# Patient Record
Sex: FEMALE | Race: BLACK OR AFRICAN AMERICAN | NOT HISPANIC OR LATINO | URBAN - METROPOLITAN AREA
[De-identification: names, ages, dates, MRNs, and addresses within clinical notes are randomized per-mention and may not be internally consistent; named-entity substitution may affect disease eponyms.]

---

## 2021-06-16 ENCOUNTER — INPATIENT (INPATIENT)
Facility: HOSPITAL | Age: 81
LOS: 1 days | Discharge: HOME | End: 2021-06-18
Attending: INTERNAL MEDICINE | Admitting: INTERNAL MEDICINE
Payer: MEDICARE

## 2021-06-16 VITALS
OXYGEN SATURATION: 99 % | DIASTOLIC BLOOD PRESSURE: 81 MMHG | WEIGHT: 136.91 LBS | RESPIRATION RATE: 18 BRPM | SYSTOLIC BLOOD PRESSURE: 143 MMHG | TEMPERATURE: 98 F | HEART RATE: 67 BPM | HEIGHT: 64 IN

## 2021-06-16 LAB
ALBUMIN SERPL ELPH-MCNC: 3.2 G/DL — LOW (ref 3.3–5)
ALP SERPL-CCNC: 138 U/L — HIGH (ref 40–120)
ALT FLD-CCNC: 11 U/L — LOW (ref 12–78)
ANION GAP SERPL CALC-SCNC: 8 MMOL/L — SIGNIFICANT CHANGE UP (ref 5–17)
APTT BLD: 25.9 SEC — LOW (ref 27.5–35.5)
AST SERPL-CCNC: 8 U/L — LOW (ref 15–37)
BASOPHILS # BLD AUTO: 0.03 K/UL — SIGNIFICANT CHANGE UP (ref 0–0.2)
BASOPHILS NFR BLD AUTO: 0.5 % — SIGNIFICANT CHANGE UP (ref 0–2)
BILIRUB SERPL-MCNC: 1 MG/DL — SIGNIFICANT CHANGE UP (ref 0.2–1.2)
BUN SERPL-MCNC: 10 MG/DL — SIGNIFICANT CHANGE UP (ref 7–23)
CALCIUM SERPL-MCNC: 8.7 MG/DL — SIGNIFICANT CHANGE UP (ref 8.5–10.1)
CHLORIDE SERPL-SCNC: 105 MMOL/L — SIGNIFICANT CHANGE UP (ref 96–108)
CO2 SERPL-SCNC: 26 MMOL/L — SIGNIFICANT CHANGE UP (ref 22–31)
CREAT SERPL-MCNC: 1 MG/DL — SIGNIFICANT CHANGE UP (ref 0.5–1.3)
EOSINOPHIL # BLD AUTO: 0.09 K/UL — SIGNIFICANT CHANGE UP (ref 0–0.5)
EOSINOPHIL NFR BLD AUTO: 1.5 % — SIGNIFICANT CHANGE UP (ref 0–6)
FLUAV AG NPH QL: SIGNIFICANT CHANGE UP
FLUBV AG NPH QL: SIGNIFICANT CHANGE UP
GLUCOSE BLDC GLUCOMTR-MCNC: 110 MG/DL — HIGH (ref 70–99)
GLUCOSE SERPL-MCNC: 114 MG/DL — HIGH (ref 70–99)
HCT VFR BLD CALC: 40.1 % — SIGNIFICANT CHANGE UP (ref 34.5–45)
HGB BLD-MCNC: 12.3 G/DL — SIGNIFICANT CHANGE UP (ref 11.5–15.5)
IMM GRANULOCYTES NFR BLD AUTO: 0.2 % — SIGNIFICANT CHANGE UP (ref 0–1.5)
INR BLD: 0.99 RATIO — SIGNIFICANT CHANGE UP (ref 0.88–1.16)
LYMPHOCYTES # BLD AUTO: 0.89 K/UL — LOW (ref 1–3.3)
LYMPHOCYTES # BLD AUTO: 14.9 % — SIGNIFICANT CHANGE UP (ref 13–44)
MCHC RBC-ENTMCNC: 24.3 PG — LOW (ref 27–34)
MCHC RBC-ENTMCNC: 30.7 GM/DL — LOW (ref 32–36)
MCV RBC AUTO: 79.2 FL — LOW (ref 80–100)
MONOCYTES # BLD AUTO: 0.61 K/UL — SIGNIFICANT CHANGE UP (ref 0–0.9)
MONOCYTES NFR BLD AUTO: 10.2 % — SIGNIFICANT CHANGE UP (ref 2–14)
NEUTROPHILS # BLD AUTO: 4.34 K/UL — SIGNIFICANT CHANGE UP (ref 1.8–7.4)
NEUTROPHILS NFR BLD AUTO: 72.7 % — SIGNIFICANT CHANGE UP (ref 43–77)
NRBC # BLD: 0 /100 WBCS — SIGNIFICANT CHANGE UP (ref 0–0)
PLATELET # BLD AUTO: 181 K/UL — SIGNIFICANT CHANGE UP (ref 150–400)
POTASSIUM SERPL-MCNC: 3.4 MMOL/L — LOW (ref 3.5–5.3)
POTASSIUM SERPL-SCNC: 3.4 MMOL/L — LOW (ref 3.5–5.3)
PROT SERPL-MCNC: 7.9 GM/DL — SIGNIFICANT CHANGE UP (ref 6–8.3)
PROTHROM AB SERPL-ACNC: 11.5 SEC — SIGNIFICANT CHANGE UP (ref 10.6–13.6)
RBC # BLD: 5.06 M/UL — SIGNIFICANT CHANGE UP (ref 3.8–5.2)
RBC # FLD: 17.8 % — HIGH (ref 10.3–14.5)
SARS-COV-2 RNA SPEC QL NAA+PROBE: SIGNIFICANT CHANGE UP
SODIUM SERPL-SCNC: 139 MMOL/L — SIGNIFICANT CHANGE UP (ref 135–145)
TROPONIN I SERPL-MCNC: <.015 NG/ML — SIGNIFICANT CHANGE UP (ref 0.01–0.04)
WBC # BLD: 5.97 K/UL — SIGNIFICANT CHANGE UP (ref 3.8–10.5)
WBC # FLD AUTO: 5.97 K/UL — SIGNIFICANT CHANGE UP (ref 3.8–10.5)

## 2021-06-16 PROCEDURE — 70498 CT ANGIOGRAPHY NECK: CPT | Mod: 26,MA

## 2021-06-16 PROCEDURE — 99291 CRITICAL CARE FIRST HOUR: CPT

## 2021-06-16 PROCEDURE — G0426: CPT | Mod: 95

## 2021-06-16 PROCEDURE — 0042T: CPT

## 2021-06-16 PROCEDURE — 93010 ELECTROCARDIOGRAM REPORT: CPT

## 2021-06-16 PROCEDURE — 70496 CT ANGIOGRAPHY HEAD: CPT | Mod: 26,MA

## 2021-06-16 PROCEDURE — 70450 CT HEAD/BRAIN W/O DYE: CPT | Mod: 26,MA,59

## 2021-06-16 PROCEDURE — 99222 1ST HOSP IP/OBS MODERATE 55: CPT

## 2021-06-16 RX ORDER — POTASSIUM CHLORIDE 20 MEQ
40 PACKET (EA) ORAL ONCE
Refills: 0 | Status: COMPLETED | OUTPATIENT
Start: 2021-06-16 | End: 2021-06-16

## 2021-06-16 RX ORDER — ASPIRIN/CALCIUM CARB/MAGNESIUM 324 MG
325 TABLET ORAL ONCE
Refills: 0 | Status: COMPLETED | OUTPATIENT
Start: 2021-06-16 | End: 2021-06-16

## 2021-06-16 RX ADMIN — Medication 40 MILLIEQUIVALENT(S): at 23:54

## 2021-06-16 RX ADMIN — Medication 325 MILLIGRAM(S): at 21:50

## 2021-06-16 NOTE — ED ADULT NURSE NOTE - NEURO MENTATION
normal

## 2021-06-16 NOTE — ED ADULT NURSE NOTE - NSIMPLEMENTINTERV_GEN_ALL_ED
Implemented All Fall Risk Interventions:  [place] to call system. Call bell, personal items and telephone within reach. Instruct patient to call for assistance. Room bathroom lighting operational. Non-slip footwear when patient is off stretcher. Physically safe environment: no spills, clutter or unnecessary equipment. Stretcher in lowest position, wheels locked, appropriate side rails in place. Provide visual cue, wrist band, yellow gown, etc. Monitor gait and stability. Monitor for mental status changes and reorient to person, place, and time. Review medications for side effects contributing to fall risk. Reinforce activity limits and safety measures with patient and family.

## 2021-06-16 NOTE — ED PROVIDER NOTE - CLINICAL SUMMARY MEDICAL DECISION MAKING FREE TEXT BOX
DDx: CVA outside of tPA window. Potential for cloth retrieval. Rule out ICH.   Plan: Code stroke. CBC, CMP, Troponin, neurology consult. Likely admission.

## 2021-06-16 NOTE — ED ADULT NURSE NOTE - NURSING NEURO ORIENTATION
Patient is a 64y old  Female who presents with a chief complaint of cp (01 Mar 2020 11:16)        PAST MEDICAL & SURGICAL HISTORY:  Hypothyroid  History of spider veins  Low back pain radiating to left leg  Lumbar herniated disc: L4-5 and L5-S1  Hyperlipidemia  Hypertension  Myocardial infarction:   Type 2 diabetes mellitus  H/O arthroscopy of right knee:   History of bunionectomy of left great toe:   H/O cataract extraction: bilateral 2019  S/P total hysterectomy:  for uterine fibroids  S/P primary angioplasty with coronary stent: 3 stents 2010 and 2 stents 2010      Summary of admission HPI:                PREVIOUS DIAGNOSTIC TESTING:      ECHO  FINDINGS:    STRESS  FINDINGS:    CATHETERIZATION  FINDINGS:    ELECTROPHYSIOLOGY STUDY  FINDINGS:    CAROTID ULTRASOUND:  FINDINGS    VENOUS DUPLEX SCAN:  FINDINGS:    CHEST CT PULMONARY ANGIO with IV Contrast:  FINDINGS:  MEDICATIONS  (STANDING):  aspirin  chewable 81 milliGRAM(s) Oral daily  atorvastatin 20 milliGRAM(s) Oral at bedtime  clopidogrel Tablet 75 milliGRAM(s) Oral daily  dextrose 5%. 1000 milliLiter(s) (50 mL/Hr) IV Continuous <Continuous>  dextrose 50% Injectable 12.5 Gram(s) IV Push once  dextrose 50% Injectable 25 Gram(s) IV Push once  dextrose 50% Injectable 25 Gram(s) IV Push once  gabapentin 600 milliGRAM(s) Oral at bedtime  heparin  Infusion.  Unit(s)/Hr (8 mL/Hr) IV Continuous <Continuous>  insulin glargine Injectable (LANTUS) 20 Unit(s) SubCutaneous at bedtime  insulin lispro (HumaLOG) corrective regimen sliding scale   SubCutaneous three times a day before meals  isosorbide   mononitrate ER Tablet (IMDUR) 60 milliGRAM(s) Oral daily  levothyroxine 50 MICROGram(s) Oral daily  metoprolol tartrate 50 milliGRAM(s) Oral two times a day  potassium chloride    Tablet ER 40 milliEquivalent(s) Oral once    MEDICATIONS  (PRN):  dextrose 40% Gel 15 Gram(s) Oral once PRN Blood Glucose LESS THAN 70 milliGRAM(s)/deciliter  glucagon  Injectable 1 milliGRAM(s) IntraMuscular once PRN Glucose LESS THAN 70 milligrams/deciliter  heparin  Injectable 4100 Unit(s) IV Push every 6 hours PRN For aPTT less than 40  nitroglycerin     SubLingual 0.4 milliGRAM(s) SubLingual every 5 minutes PRN Chest Pain      FAMILY HISTORY:  Family history of myocardial infarction (Father)  Family history of type 2 diabetes mellitus (Father, Sibling)  Family history of hypertension (Father)  Family history of stroke (Mother)      SOCIAL HISTORY:    CIGARETTES:    ALCOHOL:    REVIEW OF SYSTEMS:    CONSTITUTIONAL: No fever, weight loss, chills, shakes, or fatigue  EYES: No eye pain, visual disturbances, or discharge  ENMT:  No difficulty hearing, tinnitus, vertigo; No sinus or throat pain  NECK: No pain or stiffness  BREASTS: No pain, masses, or nipple discharge  RESPIRATORY: No cough, wheezing, hemoptysis, or shortness of breath  CARDIOVASCULAR: No chest pain, dyspnea, palpitations, dizziness, syncope, paroxysmal nocturnal dyspnea, orthopnea, or arm or leg swelling  GASTROINTESTINAL: No abdominal  or epigastric pain, nausea, vomiting, hematemesis, diarrhea, constipation, melena or bright red blood.  GENITOURINARY: No dysuria, nocturia, hematuria, or urinary incontinence  NEUROLOGICAL: No headaches, memory loss, slurred speech, limb weakness, loss of strength, numbness, or tremors  SKIN: No itching, burning, rashes, or lesions   LYMPH NODES: No enlarged glands  ENDOCRINE: No heat or cold intolerance, or hair loss  MUSCULOSKELETAL: No joint pain or swelling, muscle, back, or extremity pain  PSYCHIATRIC: No depression, anxiety, or difficulty sleeping  HEME/LYMPH: No easy bruising or bleeding gums  ALLERY AND IMMUNOLOGIC: No hives or rash.      Vital Signs Last 24 Hrs  T(C): 36.3 (01 Mar 2020 15:45), Max: 36.7 (01 Mar 2020 07:59)  T(F): 97.3 (01 Mar 2020 15:45), Max: 98 (01 Mar 2020 07:59)  HR: 93 (01 Mar 2020 15:45) (92 - 99)  BP: 110/68 (01 Mar 2020 15:45) (103/59 - 147/89)  BP(mean): --  RR: 19 (01 Mar 2020 15:45) (14 - 19)  SpO2: 98% (01 Mar 2020 15:45) (96% - 98%)    PHYSICAL EXAM:    GENERAL: In no apparent distress, well nourished, and hydrated.  HEAD:  Atraumatic, Normocephalic  EYES: EOMI, PERRLA, conjunctiva and sclera clear  ENMT: No tonsillar erythema, exudates, or enlargement; Moist mucous membranes, Good dentition, No lesions  NECK: Supple and normal thyroid.  No JVD or carotid bruit.  Carotid pulse is 2+ bilaterally.  HEART: Regular rate and rhythm; No murmurs, rubs, or gallops.  PULMONARY: Clear to auscultation and perfusion.  No rales, wheezing, or rhonchi bilaterally.  ABDOMEN: Soft, Nontender, Nondistended; Bowel sounds present  EXTREMITIES:  2+ Peripheral Pulses, No clubbing, cyanosis, or edema  LYMPH: No lymphadenopathy noted  NEUROLOGICAL: Grossly nonfocal          INTERPRETATION OF TELEMETRY:    ECG:    I&O's Detail      LABS:                        12.4   8.47  )-----------( 206      ( 01 Mar 2020 16:27 )             37.9     03-01    138  |  103  |  13  ----------------------------<  423<H>  3.4<L>   |  27  |  1.19    Ca    9.9      01 Mar 2020 08:37    TPro  6.9  /  Alb  3.4  /  TBili  0.5  /  DBili  x   /  AST  11<L>  /  ALT  24  /  AlkPhos  89  03-01    CARDIAC MARKERS ( 01 Mar 2020 12:34 )  2.020 ng/mL / x     / x     / x     / 13.1 ng/mL  CARDIAC MARKERS ( 01 Mar 2020 08:37 )  .054 ng/mL / x     / x     / x     / x          PT/INR - ( 01 Mar 2020 08:37 )   PT: 11.7 sec;   INR: 1.04 ratio         PTT - ( 01 Mar 2020 08:37 )  PTT:31.0 sec    BNP  I&O's Detail    Daily Height in cm: 167.64 (01 Mar 2020 15:45)    Daily Weight in k.9 (01 Mar 2020 15:45)    RADIOLOGY & ADDITIONAL STUDIES:
oriented to person, place and time

## 2021-06-16 NOTE — ED ADULT TRIAGE NOTE - CHIEF COMPLAINT QUOTE
brought to er by her niece who noted pt with slurred speech pt c/o dizziness last seen normal 2200 last night no focal deficits or facial asymmetry noted niece states pt confused

## 2021-06-16 NOTE — PROGRESS NOTE ADULT - SUBJECTIVE AND OBJECTIVE BOX
HISTORY OF PRESENT ILLNESS:  Ms. Denny  (MRN [id number])  is a 80y right -handed female with stroke risk factors of HTN and DM  presented to [facility] . Last known normal at 10 pm      PAST MEDICAL HISTORY:  SLURRED SPEACH    HTN (hypertension)    HLD (hyperlipidemia)        PAST SURGICAL HISTORY:    HOME MEDICATIONS:  Home Medications:      FAMILY HISTORY:    SOCIAL HISTORY:    ALLERGIES:  penicillin (Unknown)      VITALS/DATA/ORDERS: [x] Reviewed  Vital Signs Last 24 Hrs  T(C): 36.7 (16 Jun 2021 19:54), Max: 36.7 (16 Jun 2021 19:54)  T(F): 98 (16 Jun 2021 19:54), Max: 98 (16 Jun 2021 19:54)  HR: 68 (16 Jun 2021 20:26) (67 - 70)  BP: 136/66 (16 Jun 2021 20:26) (136/66 - 162/81)  BP(mean): --  RR: 16 (16 Jun 2021 20:26) (16 - 18)  SpO2: 98% (16 Jun 2021 20:26) (98% - 99%)                        12.3   5.97  )-----------( 181      ( 16 Jun 2021 20:24 )             40.1           PT/INR - ( 16 Jun 2021 20:24 )   PT: 11.5 sec;   INR: 0.99 ratio    PTT - ( 16 Jun 2021 20:24 )  PTT:25.9 sec    POCT Blood Glucose.: 110 mg/dL (16 Jun 2021 19:51)    CT Head:      CT Angio Neck w/ IV Cont (06.16.21 @ 20:16) >  IMPRESSION:      HEAD CT AND RAPID PERFUSION:    HEAD CT: No acute intracranial hemorrhage or acute territorial infarction.    CT PERFUSION demonstrated: Suboptimal study. No territorial perfusion abnormality.    If symptoms persist consider follow up head CT or MRI, MRA  if no contraindication.    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    CTA NECK: Patent, ECAs, ICAs, no  hemodynamically significant stenosis at  ICA origins by NASCET criteria.  Bilateral vertebral arteries are patent without flow limiting stenosis.  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      EXAMINATION: Assisted by (OSH staff)  NIHSS:    1A: Level of consciousness       0= Alert; keenly responsive       +1= Arouses to minor stimulation       +2= Requires repeated stimulation to arouse       +2= Movements to pain       +3= Postures or unresponsive  1B: Ask month and age       0= Both questions right       +1= 1 question right       +2= 0 questions right       +1= Dysarthric/intubated/trauma/language barrier       +2= Aphasic  1C: "Blink eyes" and "Squeeze Hands"       0= Performs both       +1= Performs 1 task       +2= Performs 0 tasks    2: Horizontal EOMs       0= Normal       +1= Partial gaze palsy: can be overcome       +1= Partial gaze palsy: corrects w/ oculocephalic reflex        +2= Forzed gaze palsy: cannot be overcome    3: Visual fields       0= No visual loss       +1= Partial hemianopia       +2= Complete hemianopia       +3= Patient is b/l blind       +3= B/l hemianopia    4: Facial palsy (use grimace if obtunded)       0= Normal symmetry       +1= Minor paralysis (flat NLF, smile asymmetry)       +2= Partial paralysis ( lower face)       +3= Unilateral complete paralysis (upper/lower face)       +3= B/l complete paralysis (upper/lower face)    5A: Left arm motor drift (count out loud and use fingers to show count)       0= No drift x 10 seconds       +1= Drift but doesn't hit bed       +2= Drift, hits bed       +2= Some effort against gravity       +3= No effort against gravity       +4= No movement       0= Amputation/joint fusion  5B: Right arm motor drift       0= No drift x 10 seconds       +1= Drift but doesn't hit bed       +2= Drift, hits bed       +2= Some effort against gravity       +3= No effort against gravity       +4= No movement       0= Amputation/joint fusion    6A: Left leg motor drift       0= No drift x 10 seconds       +1= Drift but doesn't hit bed       +2= Drift, hits bed       +2= Some effort against gravity       +3= No effort against gravity       +4= No movement       0= Amputation/joint fusion    6B: Right leg motor drift       0= No drift x 10 seconds       +1= Drift but doesn't hit bed       +2= Drift, hits bed       +2= Some effort against gravity       +3= No effort against gravity       +4= No movement       0= Amputation/joint fusion    7: Limb ataxia (FNF/heel-shin)       0= No ataxia       +1= Ataxia in 1 limb       +2= Ataxia in 2 limbs       0= Does not understand       0= Paralyzed       0= Amputation/joint fusion    8: Sensation       0= Normal, no sensory loss       +1= mild-moderate loss: less sharp/more dull       +1= mild-moderate loss: can sense being touched       +2= Complete loss: cannot sense being touched at all       +2= No response and quadriplegic       +2= Coma/unresponsive    9: Language/aphasia- describe the scene (on cristobal); name the items; read the sentences (on cristobal)       0= Normal, no aphasia       +1= mild-moderate aphaisa: some obvious changes without significant limitation       +2= Severe aphasia: fragmentary expression, inference needed, cannot identify materials       +3= Mute/global aphasia: no usable speech/auditory comprehension       +3= coma/unresponsive    10: Dysarthria- read the words       0= Normal       +1= mild-moderate dysarthria: slurring but can be understood       +2= Severe dysarthria: unintelligible slurring or out of proportion to dysphasia       +2= Mute/anarthric        0= Intubated/unable to test    11: Extinction/inattention       0= No abnormality       +1= visual/tactile/auditory/spatial/personal inattention       +1= Extinction to b/l simultaneous stimulation       +2= Profound jayne-inattention (e.g. does not recognize own hand)       +2= extinction to > 1 modality    IV rTPA INCLUSION CRITERIA  [] Symptoms suggestive of ischemic stroke that are deemed to be disabling, regardless of improvement   [] Able to initiate treatment within [] 3 hours [] 4.5 hours of time last known well    IV rTPA CONTRAINDICATIONS  [] None    EXCLUSION CRITERIA:  Absolute Contraindications  [] Uncontrolled hypertension at the start of treatment with tPA (SBP>185 mm Hg and/or DBP>110 mm Hg)  [] Evidence of intracranial hemorrhage on pretreatment evaluation  [] Suspicion of subarachnoid hemorrhage  [] Recent intracranial surgery or serious head trauma within 3 months  [] Major surgeries and/or major trauma within 15 days  [] Active internal bleeding within 21 days  [] Intracranial neoplasm, AVM or aneurysm  [] Known bleeding diathesis (including INR>1.7, heparin within last 12 hours and a PTT > 1.5 x normal, platelet count < 100,000)  [] Acute pericarditis or infective endocarditis  [] Patient or family declines and understands risks and benefits of treatment.  Additional exclusion criteria between 3 and 4.5 hours:  [] Age >80 years  [] Severe stroke (NIHSS > 25)  [] History of diabetes and prior stroke  [] Taking an oral anticoagulant regardless of INR  Relative Contraindications/ Warnings:  [] Glucose < 50 or > 400 mg/dL at time of bolus  [] Arterial puncture in non-compressible site  [] Minor or rapidly improving stroke within previous 7 days  [] Recent MI within 3 months  [] Previous stroke within the past 3 months  [] History of intracranial hemorrhage  [] Seizure at onset of stroke  [] Life expectancy < 1 year or severe co-morbid illness  [] Pregnancy    RISKS/BENEFITS DISCUSSION:  The risks and benefits of IV rTPA administration was discussed with patient/family in presence of OSH staff.  Current treatment recommendations for eligible patients with acute ischemic stroke have proven highly beneficial with acceptable risk. Complications related to intravenous r-tPA include symptomatic intracranial hemorrhage, major systemic hemorrhage and angioedema in approximately 6%, 2%, and 5% of patients, respectively. There is a 2.8% risk of intracerebral bleeding in patients treated in the 3-4.5 hour window (compared to 0.2% not treated with r-tPA) according to the ECASS III Study with no increase in mortality compared to placebo groups. Early Alteplase IV r-tPA is the standard of care for acute stroke patients. Rapid intervention is critical to successful treatment.    RECOMMENDATIONS:  [] Administer IV rTPA; bolus: ; infusion:  [] Transfer to [facility] for further stroke care  [] Transfer to [facility] for evaluation for mechanical thrombectomy     Plan discussed with ___. HISTORY OF PRESENT ILLNESS:  Ms. Denny  (MRN [id number])  is a 80y right -handed female with stroke risk factors of HTN and DM  presented to [facility] . Last known normal at 10 pm on 6/15/21. Patient's niece, who lives with her, noticed slurred speech around 3pm today. Last time patient was seen normal was 10pm the night before. Patient denies fever, chills, nausea, vomiting, visual, motor gait, or sensory, disturbance. She took all her meds.        PAST MEDICAL HISTORY:  HTN (hypertension)  HLD (hyperlipidemia)      HOME MEDICATIONS:  Home Medications:  Amlodipine  ACE- Inhibitor- ? name  Propranolol  Simvastatin    ALLERGIES:  penicillin (Unknown)      VITALS/DATA/ORDERS: [x] Reviewed  Vital Signs Last 24 Hrs  T(C): 36.7 (16 Jun 2021 19:54), Max: 36.7 (16 Jun 2021 19:54)  T(F): 98 (16 Jun 2021 19:54), Max: 98 (16 Jun 2021 19:54)  HR: 68 (16 Jun 2021 20:26) (67 - 70)  BP: 136/66 (16 Jun 2021 20:26) (136/66 - 162/81)  RR: 16 (16 Jun 2021 20:26) (16 - 18)  SpO2: 98% (16 Jun 2021 20:26) (98% - 99%)                        12.3   5.97  )-----------( 181      ( 16 Jun 2021 20:24 )             40.1     06-16    139  |  105  |  10  ----------------------------<  114<H>  3.4<L>   |  26  |  1.00    Ca    8.7      16 Jun 2021 20:24    TPro  7.9  /  Alb  3.2<L>  /  TBili  1.0  /  DBili  x   /  AST  8<L>  /  ALT  11<L>  /  AlkPhos  138<H>  06-16    PT/INR - ( 16 Jun 2021 20:24 )   PT: 11.5 sec;   INR: 0.99 ratio    PTT - ( 16 Jun 2021 20:24 )  PTT:25.9 sec    POCT Blood Glucose.: 110 mg/dL (16 Jun 2021 19:51)    CT Head:      CT Angio Neck w/ IV Cont (06.16.21 @ 20:16) >  IMPRESSION:      HEAD CT AND RAPID PERFUSION:    HEAD CT: No acute intracranial hemorrhage or acute territorial infarction.    CT PERFUSION demonstrated: Suboptimal study. No territorial perfusion abnormality.    If symptoms persist consider follow up head CT or MRI, MRA  if no contraindication.    CTA COW:  Patent intracranial circulation without flow limiting stenosis or large vessel occlusion. There is a 2.0 mm left P-comm aneurysm.    CTA NECK: Patent, ECAs, ICAs, no  hemodynamically significant stenosis at  ICA origins by NASCET criteria.  Bilateral vertebral arteries are patent without flow limiting stenosis.  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      EXAMINATION: Assisted by (OSH staff)  NIHSS: 0    1A: Level of consciousness       0= Alert; keenly responsive    1B: Ask month and age       0= Both questions right    1C: "Blink eyes" and "Squeeze Hands"       0= Performs both    2: Horizontal EOMs       0= Normal    3: Visual fields       0= No visual loss    4: Facial palsy (use grimace if obtunded)       0= Normal symmetry    5A: Left arm motor drift       0= No drift x 10 seconds    5B: Right arm motor drift       0= No drift x 10 seconds    6A: Left leg motor drift       0= No drift x 10 seconds    6B: Right leg motor drift       0= No drift x 10 seconds    7: Limb ataxia (FNF)       0= No ataxia    8: Sensation       0= Normal, no sensory loss    9: Language/aphasia- describe the scene (on cristobal); name the items; read the sentences (on cristobal)       0= Normal, no aphasia    10: Dysarthria- read the words       0= Normal    11: Extinction/inattention       0= No abnormality    Patient is a 81 yo female with hx of HTN and HLD who was seen by her niece and last known well at 10 pm 6/15/21. Her niece noted her aunt's speech was slurred and suggested be be brought to the [facility] ED. The NIHSS and CT /CTA of brain shows NO intracranial hemorrhage, infarct or large vessel occlusion.  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  The  patient will be evaluated and managed with diagnosis of TIA.  She is not a candidate for Alteplase or thrombectomy.  Plan  - ASA 325mg x1 now then 81mg  qday  - stroke core measures- HGBA1C, Lipid profile  - check TSH  - Check EKG, cardiac ECHO  - MRI and MRA of Brain ( MRA as baseline to follow incidental PCA aneurysm)  - Neurology F/U  - If recurrence of  symptoms- speech eval  - Neurosurgery F/U for incidental L PCA aneurysm     Plan discussed with patients Dr Danica faulkner and Nurse SHEETS

## 2021-06-16 NOTE — ED ADULT NURSE NOTE - OBJECTIVE STATEMENT
patient came here for slurred speech witnessed by niece around 1500 today, last seen normal 2200 last night, patient stated noticed unsteady gait earlier at home, denies n/v/dizziness, denies chest pain, denies sob, denies fever/chills, no facial droop noted

## 2021-06-16 NOTE — ED PROVIDER NOTE - OBJECTIVE STATEMENT
81 y/o F with a PMHx of HTN and HLD presents to the ED c/o slurred speech. Patient's niece who lives with her noticed slurred speech around 3pm today. Last time normal was 10pm last night. Currently in the ED, Patient admits to having a slight headache and has no facial drop or weakness. Denies CVA in the past.

## 2021-06-17 DIAGNOSIS — I10 ESSENTIAL (PRIMARY) HYPERTENSION: ICD-10-CM

## 2021-06-17 DIAGNOSIS — R47.81 SLURRED SPEECH: ICD-10-CM

## 2021-06-17 LAB
CHOLEST SERPL-MCNC: 297 MG/DL — HIGH
HDLC SERPL-MCNC: 81 MG/DL — SIGNIFICANT CHANGE UP
LIPID PNL WITH DIRECT LDL SERPL: 195 MG/DL — HIGH
NON HDL CHOLESTEROL: 216 MG/DL — HIGH
TRIGL SERPL-MCNC: 103 MG/DL — SIGNIFICANT CHANGE UP

## 2021-06-17 PROCEDURE — 12345: CPT | Mod: NC

## 2021-06-17 PROCEDURE — 93306 TTE W/DOPPLER COMPLETE: CPT | Mod: 26

## 2021-06-17 RX ORDER — AMLODIPINE BESYLATE 2.5 MG/1
5 TABLET ORAL DAILY
Refills: 0 | Status: DISCONTINUED | OUTPATIENT
Start: 2021-06-17 | End: 2021-06-18

## 2021-06-17 RX ORDER — SIMVASTATIN 20 MG/1
40 TABLET, FILM COATED ORAL AT BEDTIME
Refills: 0 | Status: DISCONTINUED | OUTPATIENT
Start: 2021-06-17 | End: 2021-06-17

## 2021-06-17 RX ORDER — ENOXAPARIN SODIUM 100 MG/ML
40 INJECTION SUBCUTANEOUS DAILY
Refills: 0 | Status: DISCONTINUED | OUTPATIENT
Start: 2021-06-17 | End: 2021-06-18

## 2021-06-17 RX ORDER — ASPIRIN/CALCIUM CARB/MAGNESIUM 324 MG
81 TABLET ORAL DAILY
Refills: 0 | Status: DISCONTINUED | OUTPATIENT
Start: 2021-06-17 | End: 2021-06-18

## 2021-06-17 RX ORDER — LISINOPRIL 2.5 MG/1
20 TABLET ORAL DAILY
Refills: 0 | Status: DISCONTINUED | OUTPATIENT
Start: 2021-06-17 | End: 2021-06-18

## 2021-06-17 RX ORDER — SIMVASTATIN 20 MG/1
20 TABLET, FILM COATED ORAL AT BEDTIME
Refills: 0 | Status: DISCONTINUED | OUTPATIENT
Start: 2021-06-17 | End: 2021-06-18

## 2021-06-17 RX ADMIN — Medication 81 MILLIGRAM(S): at 11:28

## 2021-06-17 RX ADMIN — LISINOPRIL 20 MILLIGRAM(S): 2.5 TABLET ORAL at 05:38

## 2021-06-17 RX ADMIN — SIMVASTATIN 20 MILLIGRAM(S): 20 TABLET, FILM COATED ORAL at 21:17

## 2021-06-17 RX ADMIN — ENOXAPARIN SODIUM 40 MILLIGRAM(S): 100 INJECTION SUBCUTANEOUS at 11:25

## 2021-06-17 NOTE — OCCUPATIONAL THERAPY INITIAL EVALUATION ADULT - PERTINENT HX OF CURRENT PROBLEM, REHAB EVAL
81 y/o female admitted to [facility] ED due to slurred speech. CT brain scan negative for acute findings. NIH 0 in ED.

## 2021-06-17 NOTE — OCCUPATIONAL THERAPY INITIAL EVALUATION ADULT - ADDITIONAL COMMENTS
Pt lives with Niece and family in a private house with 6 steps to enter + rails. Once inside, the pt main bedroom and bathroom is on that floor when entering. The pts bathroom has a tub/shower combination, regular toilet and no grab bars. The pt ambulates with no device and does not own any device for ambulation. The pt is R handed, does not drive and wears glasses all the time.

## 2021-06-17 NOTE — OCCUPATIONAL THERAPY INITIAL EVALUATION ADULT - STRENGTHENING, PT EVAL
Pt will increase right upper extremity strength to 5/5 to improve functional strength needed to engage in functional tasks by 4 weeks

## 2021-06-17 NOTE — H&P ADULT - NSHPLABSRESULTS_GEN_ALL_CORE
Vital Signs Last 24 Hrs  T(C): 36.6 (16 Jun 2021 23:40), Max: 36.7 (16 Jun 2021 19:54)  T(F): 97.9 (16 Jun 2021 23:40), Max: 98 (16 Jun 2021 19:54)  HR: 58 (16 Jun 2021 23:40) (58 - 73)  BP: 135/80 (16 Jun 2021 23:40) (114/80 - 162/81)  BP(mean): --  RR: 17 (16 Jun 2021 23:40) (12 - 18)  SpO2: 97% (16 Jun 2021 23:40) (96% - 99%)    CBC Full  -  ( 16 Jun 2021 20:24 )  WBC Count : 5.97 K/uL  RBC Count : 5.06 M/uL  Hemoglobin : 12.3 g/dL  Hematocrit : 40.1 %  Platelet Count - Automated : 181 K/uL  Mean Cell Volume : 79.2 fl  Mean Cell Hemoglobin : 24.3 pg  Mean Cell Hemoglobin Concentration : 30.7 gm/dL  Auto Neutrophil # : 4.34 K/uL  Auto Lymphocyte # : 0.89 K/uL  Auto Monocyte # : 0.61 K/uL  Auto Eosinophil # : 0.09 K/uL  Auto Basophil # : 0.03 K/uL  Auto Neutrophil % : 72.7 %  Auto Lymphocyte % : 14.9 %  Auto Monocyte % : 10.2 %  Auto Eosinophil % : 1.5 %  Auto Basophil % : 0.5 %    06-16    139  |  105  |  10  ----------------------------<  114<H>  3.4<L>   |  26  |  1.00    Ca    8.7      16 Jun 2021 20:24    TPro  7.9  /  Alb  3.2<L>  /  TBili  1.0  /  DBili  x   /  AST  8<L>  /  ALT  11<L>  /  AlkPhos  138<H>  06-16      Home Medications:  amLODIPine 5 mg oral tablet: orally 2 times a day (16 Jun 2021 21:43)  benazepril-hydrochlorothiazide 20 mg-12.5 mg oral tablet: 1 tab(s) orally once a day (16 Jun 2021 21:43)  propranolol 40 mg oral tablet: 1 tab(s) orally 2 times a day (16 Jun 2021 21:43)  simvastatin 40 mg oral tablet: 1 tab(s) orally once a day (at bedtime) (16 Jun 2021 21:43)    LIVER FUNCTIONS - ( 16 Jun 2021 20:24 )  Alb: 3.2 g/dL / Pro: 7.9 gm/dL / ALK PHOS: 138 U/L / ALT: 11 U/L / AST: 8 U/L / GGT: x           PT/INR - ( 16 Jun 2021 20:24 )   PT: 11.5 sec;   INR: 0.99 ratio         PTT - ( 16 Jun 2021 20:24 )  PTT:25.9 sec

## 2021-06-17 NOTE — PHYSICAL THERAPY INITIAL EVALUATION ADULT - ADDITIONAL COMMENTS
As per pt, she lives with her niece in a house with 6 steps to enter with bilateral rails. Once inside there are no other steps, She was independent in all functional mobility without any AD, PTA

## 2021-06-17 NOTE — CONSULT NOTE ADULT - ASSESSMENT
TO BE COMPLETED        She has some relatively mild cognitive deficits.      Likely TIA.  No evident language disturbance or dysathria at this time.      Generalized asthenia.      Gait impairment; risk of falls.    Signs of cervical myelopathy.        RECOMMENDATIONS    Continue ASA 81mg daily.    Add clopidogrel 75mg daily to stop 20 days after yesterday.      MR non-con head.  Does NOT need MRA as she had a CTA.  Please cancel MRA head.    Cardiac monitoring.    TTE.          (If not already ordered) ESR, CRP, RF, SPEP, B12, folate, TSH, methylmalonic acid, homocysteine, syphilis serology, zinc, copper.

## 2021-06-17 NOTE — PHYSICAL THERAPY INITIAL EVALUATION ADULT - PLANNED THERAPY INTERVENTIONS, PT EVAL
Pt will be able to negotiate  5 steps with bilateral rails  with sup, in 1 week./balance training/bed mobility training/gait training/strengthening/transfer training

## 2021-06-17 NOTE — OCCUPATIONAL THERAPY INITIAL EVALUATION ADULT - GENERAL OBSERVATIONS, REHAB EVAL
Pt was encountered semi-supine in bed; NAD, portable telemetry +, primafit +, AXOX3, coherent, comprehension intact, cooperative, followed 1 step commands, required verbal cues for multi step commands; required extra time for cognitive processing. OT performed initial neurological exam (NIH) prior to patient performing functional tasks and transfers/mobility. Pt scored a 1-2 on the NIH.

## 2021-06-17 NOTE — PHYSICAL THERAPY INITIAL EVALUATION ADULT - CRITERIA FOR SKILLED THERAPEUTIC INTERVENTIONS
home with home PT, No DME needed/impairments found/functional limitations in following categories/risk reduction/prevention/rehab potential/therapy frequency/predicted duration of therapy intervention/anticipated equipment needs at discharge/anticipated discharge recommendation

## 2021-06-17 NOTE — OCCUPATIONAL THERAPY INITIAL EVALUATION ADULT - BED MOBILITY TRAINING, PT EVAL
Patient will be able to perform bed mobility tasks independently, using least restrictive device, within 1 week.

## 2021-06-17 NOTE — CONSULT NOTE ADULT - SUBJECTIVE AND OBJECTIVE BOX
TO BE COMPLETED    History per Admission H&P earlier today. Note: In the quoted History of Present Illness "this afternoon' refers to 6/16/21.     <Start of quote from H&P>  "History of Present Illness:   79 yo female with PMH of HTN and HLD presented to ED for slur speech. pt is poor historian. per report, pt was noticed slur speech this afternoon at 3 p, last known normal was last night. when pt arrived to ED, symptoms almost resolved pt did report headache. denies numbness, no muscle weakness. no other focal neuro deficits.      Review of Systems:  Review of Systems: Constitutional: no fever, chills, night sweats  Ears: no hearing changes or ear pain,   Nose: no nasal congestion, sinus pain, or rhinorrhea  Cardio: no chest pain, orthopnea, edema, or palpitations  Resp: no dyspnea, cough, wheezing  GI: no nausea, vomiting, diarrhea, constipation, hematochezia, or melena  : no dysuria, urinary frequency, hematuria  MSK: no back pain, neck pain  Skin: no rash, pruritis   Neuro: slur speech    Heme/Lymph: no bruising or bleeding    Allergies and Intolerances:        Allergies:  	penicillin: Drug, Unknown    Home Medications:   * Patient Currently Takes Medications as of 16-Jun-2021 21:43 documented in Structured Notes  · 	amLODIPine 5 mg oral tablet: Last Dose Taken:  , orally 2 times a day  · 	benazepril-hydrochlorothiazide 20 mg-12.5 mg oral tablet: Last Dose Taken:  , 1 tab(s) orally once a day  · 	propranolol 40 mg oral tablet: Last Dose Taken:  , 1 tab(s) orally 2 times a day  · 	simvastatin 40 mg oral tablet: Last Dose Taken:  , 1 tab(s) orally once a day (at bedtime)    Patient History:    Social History:  Social History (marital status, living situation, occupation, tobacco use, alcohol and drug use, and sexual history): denies smoke, no drink alcohol . no ileal drug use     Tobacco Screening:  · Core Measure Site	Yes  · Has the patient used tobacco in the past 30 days?	No    Risk Assessment:    Present on Admission:  Deep Venous Thrombosis	no  Pulmonary Embolus	no     Heart Failure:  Does this patient have a history of or has been diagnosed with heart failure? unknown."  <End of quote from H&P>      Per radiology report of CT head, CTAs brain and neck, and CTP:  "HEAD CT: No acute intracranial hemorrhage or acute territorial infarction.    CT PERFUSION demonstrated: Suboptimal study. No territorial perfusion abnormality.    If symptoms persist consider follow up head CT or MRI, MRA  if no contraindication.    CTA COW:  Patent intracranial circulation without flow limiting stenosis or large vessel occlusion. There is a 2.0 mm left P-comm aneurysm.    CTA NECK: Patent, ECAs, ICAs, no  hemodynamically significant stenosis at  ICA origins by NASCET criteria.  Bilateral vertebral arteries are patent without flow limiting stenosis.  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

## 2021-06-17 NOTE — H&P ADULT - ASSESSMENT
81 yo female with PMH of HTN and HLD presented to ED for slur speech. pt is poor historian. per report, pt was noticed slur speech this afternoon at 3 p, last known normal was last night. when pt arrived to ED, symptoms almost resolved pt did report headache. denies numbness, no muscle weakness. no other focal neuro deficits.     at ED TPA not initiated. CTA head/neck found  There is a 2.0 mm left P-comm aneurysm. no acute hemorrhage.    neuro was consulted and pending \  pt admitted to tele

## 2021-06-17 NOTE — OCCUPATIONAL THERAPY INITIAL EVALUATION ADULT - PHYSICAL ASSIST/NONPHYSICAL ASSIST, REHAB EVAL
supervision/verbal cues/nonverbal cues (demo/gestures) set-up required/supervision/verbal cues/nonverbal cues (demo/gestures)

## 2021-06-17 NOTE — H&P ADULT - NSHPREVIEWOFSYSTEMS_GEN_ALL_CORE
Constitutional: no fever, chills, night sweats  Ears: no hearing changes or ear pain,   Nose: no nasal congestion, sinus pain, or rhinorrhea  Cardio: no chest pain, orthopnea, edema, or palpitations  Resp: no dyspnea, cough, wheezing  GI: no nausea, vomiting, diarrhea, constipation, hematochezia, or melena  : no dysuria, urinary frequency, hematuria  MSK: no back pain, neck pain  Skin: no rash, pruritis   Neuro: slur speech    Heme/Lymph: no bruising or bleeding

## 2021-06-17 NOTE — OCCUPATIONAL THERAPY INITIAL EVALUATION ADULT - NS ASR FOLLOW COMMAND OT EVAL
able to follow multi step commands 50% of the time./100% of the time/able to follow single-step instructions

## 2021-06-17 NOTE — H&P ADULT - HISTORY OF PRESENT ILLNESS
79 yo female with PMH of HTN and HLD presented to ED for slur speech. pt is poor historian. per report, pt was noticed slur speech this afternoon at 3 p, last known normal was last night. when pt arrived to ED, symptoms almost resolved pt did report headache. denies numbness, no muscle weakness. no other focal neuro deficits.

## 2021-06-17 NOTE — PHYSICAL THERAPY INITIAL EVALUATION ADULT - DID THE PATIENT HAVE SURGERY?
Slurred speech, CT Brain : There is an inferiorly projecting outpouching at the supraclinoid left ICA segment measuring 2.0 x 2.01 m which likely represents a left P-comm aneurysm./n/a

## 2021-06-17 NOTE — H&P ADULT - NSHPPHYSICALEXAM_GEN_ALL_CORE
Physical exam:   General: patient in no acute distress, resting comfortably  Head:  Atraumatic, Normocephalic  Eyes: EOMI, PERRLA, clear sclera  Neck: Supple, thyroid nontender, non enlarged  Cardio: S1/S2 +ve, regular rate and rhythm, no M/G/R  Resp: clear to ausculation bilaterally, no rales or wheezes  GI: abdomen soft, nontender, non distended, no guarding, BS +ve x 4  Ext: no significant pedal edema  Neuro: CN 2-12 intact, no significant motor or sensory deficits.  Skin: No rashes or lesions

## 2021-06-18 VITALS
HEART RATE: 62 BPM | RESPIRATION RATE: 18 BRPM | DIASTOLIC BLOOD PRESSURE: 73 MMHG | SYSTOLIC BLOOD PRESSURE: 122 MMHG | TEMPERATURE: 98 F | OXYGEN SATURATION: 98 %

## 2021-06-18 LAB
A1C WITH ESTIMATED AVERAGE GLUCOSE RESULT: 5.9 % — HIGH (ref 4–5.6)
COVID-19 SPIKE DOMAIN AB INTERP: POSITIVE
COVID-19 SPIKE DOMAIN ANTIBODY RESULT: >250 U/ML — HIGH
ESTIMATED AVERAGE GLUCOSE: 123 MG/DL — HIGH (ref 68–114)
SARS-COV-2 IGG+IGM SERPL QL IA: >250 U/ML — HIGH
SARS-COV-2 IGG+IGM SERPL QL IA: POSITIVE
TSH SERPL-MCNC: 1.49 UU/ML — SIGNIFICANT CHANGE UP (ref 0.36–3.74)
VIT B12 SERPL-MCNC: 229 PG/ML — LOW (ref 232–1245)

## 2021-06-18 PROCEDURE — 99239 HOSP IP/OBS DSCHRG MGMT >30: CPT

## 2021-06-18 RX ORDER — CLOPIDOGREL BISULFATE 75 MG/1
1 TABLET, FILM COATED ORAL
Qty: 20 | Refills: 0
Start: 2021-06-18 | End: 2021-07-07

## 2021-06-18 RX ORDER — LISINOPRIL 2.5 MG/1
1 TABLET ORAL
Qty: 30 | Refills: 0
Start: 2021-06-18 | End: 2021-07-17

## 2021-06-18 RX ORDER — PROPRANOLOL HCL 160 MG
1 CAPSULE, EXTENDED RELEASE 24HR ORAL
Qty: 0 | Refills: 0 | DISCHARGE

## 2021-06-18 RX ORDER — ASPIRIN/CALCIUM CARB/MAGNESIUM 324 MG
1 TABLET ORAL
Qty: 30 | Refills: 0
Start: 2021-06-18 | End: 2021-07-17

## 2021-06-18 RX ORDER — ATORVASTATIN CALCIUM 80 MG/1
1 TABLET, FILM COATED ORAL
Qty: 30 | Refills: 0
Start: 2021-06-18 | End: 2021-07-17

## 2021-06-18 RX ORDER — SIMVASTATIN 20 MG/1
1 TABLET, FILM COATED ORAL
Qty: 0 | Refills: 0 | DISCHARGE

## 2021-06-18 RX ORDER — BENAZEPRIL HYDROCHLORIDE AND HYDROCHLOROTHIAZIDE 10; 12.5 MG/1; MG/1
1 TABLET, FILM COATED ORAL
Qty: 0 | Refills: 0 | DISCHARGE

## 2021-06-18 RX ORDER — AMLODIPINE BESYLATE 2.5 MG/1
0 TABLET ORAL
Qty: 0 | Refills: 0 | DISCHARGE

## 2021-06-18 RX ORDER — ATORVASTATIN CALCIUM 80 MG/1
80 TABLET, FILM COATED ORAL AT BEDTIME
Refills: 0 | Status: DISCONTINUED | OUTPATIENT
Start: 2021-06-18 | End: 2021-06-18

## 2021-06-18 RX ORDER — LISINOPRIL 2.5 MG/1
40 TABLET ORAL DAILY
Refills: 0 | Status: DISCONTINUED | OUTPATIENT
Start: 2021-06-19 | End: 2021-06-18

## 2021-06-18 RX ADMIN — ENOXAPARIN SODIUM 40 MILLIGRAM(S): 100 INJECTION SUBCUTANEOUS at 12:59

## 2021-06-18 RX ADMIN — Medication 81 MILLIGRAM(S): at 12:59

## 2021-06-18 NOTE — DISCHARGE NOTE PROVIDER - NSDCCPCAREPLAN_GEN_ALL_CORE_FT
PRINCIPAL DISCHARGE DIAGNOSIS  Diagnosis: Brain TIA  Assessment and Plan of Treatment: Head CT: no acute changes.   MR head; deferred, open MRI recommended   Echo: 60-65%, grade 1; benign   LDL: 195, A1c: 5.9%  continue with Aspirin, Plavix (20days) and Atorvastatin   follow up with Neurology with Dr. Mccoy, Open MR head recommended.      SECONDARY DISCHARGE DIAGNOSES  Diagnosis: HTN (hypertension)  Assessment and Plan of Treatment: continue with Lisinopril   check blood pressure daily and write in log book   follow up with PCP for management    Diagnosis: Pre-diabetes  Assessment and Plan of Treatment: HgA1c: 5.9%  Low Carbohydrate diet    Diagnosis: Hyperlipidemia  Assessment and Plan of Treatment: LDL: 195 uncontrolled   Low Fat/cholesterol diet   continue with Atrovastatin 80mg   followup with PCP for management     PRINCIPAL DISCHARGE DIAGNOSIS  Diagnosis: Brain TIA  Assessment and Plan of Treatment: Head CT: no acute changes.   MR head; deferred, open MRI recommended   Echo: 60-65%, grade 1; benign   LDL: 195, A1c: 5.9%  continue with Aspirin, Plavix (20days) and Atorvastatin   follow up with Neurology with Dr. Mccoy, Open MR head recommended.      SECONDARY DISCHARGE DIAGNOSES  Diagnosis: HTN (hypertension)  Assessment and Plan of Treatment: Discontinue with Propanolol, Benazepril/HCTZ  continue with Norvasc and Lisinopril   check blood pressure daily and write in log book   follow up with PCP for management    Diagnosis: Bradycardia  Assessment and Plan of Treatment: Secondary to [employer] Propanolol   Discontinue Propanolol    Diagnosis: Pre-diabetes  Assessment and Plan of Treatment: HgA1c: 5.9%  Low Carbohydrate diet    Diagnosis: Hyperlipidemia  Assessment and Plan of Treatment: LDL: 195 uncontrolled   Low Fat/cholesterol diet   continue with Atrovastatin 80mg   followup with PCP for management

## 2021-06-18 NOTE — CHART NOTE - NSCHARTNOTEFT_GEN_A_CORE
81 yo female with PMH of HTN and HLD presented to ED for slurred speech. Please see H&P for more details. Pt was seen and examined at the bedside. Will follow up workup and neurology consult. Plan was discussed with the patient.
Medicine Hospitalist PA    Tele tech notified provider of episode of bradycardia to 32. Patient was seen awake and alert, denied sleeping, reported feeling epigastric burning pain consistent with GERD during that time around 1:14AM. Patient is otherwise asymptomatic.     VITALS:  BP: 157/88  HR:61    81 yo female with PMH of HTN and HLD presented to ED for slurred speech with one episode of asymptomatic bradycardia.    - continue to monitor HR  - Informed RN to keep zol pads at bedside

## 2021-06-18 NOTE — DISCHARGE NOTE PROVIDER - CARE PROVIDER_API CALL
Claudia Mccoy  NEUROLOGY  [address], [place] [id number]  Phone: [phone]  Fax: [phone]  Follow Up Time:

## 2021-06-18 NOTE — DISCHARGE NOTE NURSING/CASE MANAGEMENT/SOCIAL WORK - PATIENT PORTAL LINK FT
You can access the FollowMyHealth Patient Portal offered by [facility] by registering at the following website: [email or web address]. By joining [facility]’s FollowMyHealth portal, you will also be able to view your health information using other applications (apps) compatible with our system.

## 2021-06-18 NOTE — DISCHARGE NOTE PROVIDER - NSDCMRMEDTOKEN_GEN_ALL_CORE_FT
amLODIPine 5 mg oral tablet: orally 2 times a day  aspirin 81 mg oral tablet, chewable: 1 tab(s) orally once a day  atorvastatin 80 mg oral tablet: 1 tab(s) orally once a day (at bedtime)  clopidogrel 75 mg oral tablet: 1 tab(s) orally once a day   lisinopril 40 mg oral tablet: 1 tab(s) orally once a day  Outpatient Physical Therapy : Number of sessions to be determined on initial encounter

## 2021-06-28 DIAGNOSIS — R00.1 BRADYCARDIA, UNSPECIFIED: ICD-10-CM
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
## 2021-06-28 DIAGNOSIS — K21.9 GASTRO-ESOPHAGEAL REFLUX DISEASE WITHOUT ESOPHAGITIS: ICD-10-CM
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
## 2021-06-28 DIAGNOSIS — R51.9 HEADACHE, UNSPECIFIED: ICD-10-CM

## 2021-06-28 DIAGNOSIS — Z88.0 ALLERGY STATUS TO PENICILLIN: ICD-10-CM

## 2021-06-28 DIAGNOSIS — I67.1 CEREBRAL ANEURYSM, NONRUPTURED: ICD-10-CM

## 2021-06-28 DIAGNOSIS — R73.03 PREDIABETES: ICD-10-CM

## 2021-06-28 DIAGNOSIS — R47.81 SLURRED SPEECH: ICD-10-CM

## 2021-06-28 DIAGNOSIS — E78.5 HYPERLIPIDEMIA, UNSPECIFIED: ICD-10-CM

## 2021-06-28 DIAGNOSIS — G45.9 TRANSIENT CEREBRAL ISCHEMIC ATTACK, UNSPECIFIED: ICD-10-CM

## 2021-06-28 DIAGNOSIS — T46.4X5A ADVERSE EFFECT OF ANGIOTENSIN-CONVERTING-ENZYME INHIBITORS, INITIAL ENCOUNTER: ICD-10-CM

## 2021-06-28 DIAGNOSIS — I10 ESSENTIAL (PRIMARY) HYPERTENSION: ICD-10-CM

## 2022-01-02 NOTE — OCCUPATIONAL THERAPY INITIAL EVALUATION ADULT - LEVEL OF INDEPENDENCE, REHAB EVAL
Discussed options of amlodipine, carvedilol or coumadin or aspirin. Keep appointment with cardiologist.   Josep Rousseau to ER if  Symptoms worse. Return to clinic if any concern. supervision no chest pain/no palpitations/no dyspnea on exertion

## 2023-01-27 NOTE — ED ADULT TRIAGE NOTE - TEMPERATURE IN CELSIUS (DEGREES C)
Encounter addended by: Sylvia Magallanes RN on: 1/27/2023 9:52 AM   Actions taken: Charge Capture section accepted 36.7

## 2023-07-31 NOTE — ED PROVIDER NOTE - CPE EDP CARDIAC NORM
normal...
What Type Of Note Output Would You Prefer (Optional)?: Standard Output
Hpi Title: Evaluation of a Skin Lesion
Have Your Spot(S) Been Treated In The Past?: has not been treated

## 2023-09-30 NOTE — ED ADULT NURSE NOTE - NIH STROKE SCALE: 10. DYSARTHRIA, QM
<-- Click to add NO pertinent Family History (1) Mild-to-moderate dysarthria; patient slurs at least some words and, at worst, can be understood with some difficulty